# Patient Record
Sex: FEMALE | Race: WHITE
[De-identification: names, ages, dates, MRNs, and addresses within clinical notes are randomized per-mention and may not be internally consistent; named-entity substitution may affect disease eponyms.]

---
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
## 2021-04-14 ENCOUNTER — HOSPITAL ENCOUNTER (EMERGENCY)
Dept: HOSPITAL 63 - ER | Age: 21
Discharge: HOME | End: 2021-04-14
Payer: MEDICAID

## 2021-04-14 VITALS — HEIGHT: 65 IN | WEIGHT: 230.6 LBS | BODY MASS INDEX: 38.42 KG/M2

## 2021-04-14 VITALS — DIASTOLIC BLOOD PRESSURE: 87 MMHG | SYSTOLIC BLOOD PRESSURE: 150 MMHG

## 2021-04-14 DIAGNOSIS — Z91.040: ICD-10-CM
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
## 2021-04-14 DIAGNOSIS — F31.9: ICD-10-CM

## 2021-04-14 DIAGNOSIS — F41.9: ICD-10-CM

## 2021-04-14 DIAGNOSIS — S83.92XA: Primary | ICD-10-CM
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
## 2021-04-14 DIAGNOSIS — F17.210: ICD-10-CM

## 2021-04-14 PROCEDURE — 73564 X-RAY EXAM KNEE 4 OR MORE: CPT

## 2021-04-14 PROCEDURE — 99283 EMERGENCY DEPT VISIT LOW MDM: CPT

## 2021-04-14 NOTE — PHYS DOC
Past History


Past Medical History:  Anxiety, Bipolar, Depression, Other


Additional Past Medical Histor:  ADHD


Past Surgical History:  No Surgical History


Smoking:  Cigarettes


Alcohol Use:  None


Drug Use:  None





Adult General


Chief Complaint


Chief Complaint:  LOWER EXT PAIN





HPI


HPI


Patient is a previously healthy 21 year old female who presents to the Emergency

Room complaining of left knee pain. Patient states that she works at a  

and went to step backwards and there was a child there.  She spun to miss the 

child and twisted her knee.  She is complaining of lateral anterior knee pain.  

She states is an achy feeling.  She denies any other injuries.  She did hit the 

back of her head but she did not lose consciousness.  She is not having nausea, 

vomiting, amnesia, confusion.





Review of Systems


Review of Systems


Complete ROS is negative unless otherwise documented in HPI





Allergies


Allergies





Allergies








Coded Allergies Type Severity Reaction Last Updated Verified


 


  latex Allergy Unknown  4/14/21 Yes











Physical Exam


Physical Exam


General: Awake, alert, NAD. Well Nourished, well hydrated. Cooperative


HEENT: Atraumatic, EOMI, PERRL, airway patent, moist oral mucosa


Neck: Supple, trachea midline


GI: Soft, nondistended, nontender, no masses


MSK: Left knee: Lateral tenderness, normal range of motion, intact sensation, 

intact strength.


Skin: Warm, dry, intact


Neuro: A&O x3, speech NL, sensory and motor grossly intact, no focal deficits


Psych: Normal affect, normal mood, not suicidal or homicidal





Current Patient Data


Vital Signs





                                   Vital Signs








  Date Time  Temp Pulse Resp B/P (MAP) Pulse Ox O2 Delivery O2 Flow Rate FiO2


 


4/14/21 11:15 97.8 92 16 150/87 (108) 98 Room Air  











EKG


EKG


[]





Radiology/Procedures


Radiology/Procedures


[]





Heart Score


C/O Chest Pain:  N/A


Risk Factors:


Risk Factors:  DM, Current or recent (<one month) smoker, HTN, HLP, family 

history of CAD, obesity.


Risk Scores:


Risk Factors:  DM, Current or recent (<one month) smoker, HTN, HLP, family 

history of CAD, obesity.





Course & Med Decision Making


Course & Med Decision Making


Pertinent Labs and Imaging studies reviewed. (See chart for details)





Patient is a 21-year-old female presents to the emergency room complaining of 

knee pain after twisting her knee.  She may have a knee sprain.  X-ray does not 

show any acute fractures or dislocations.  I have discussed with the patient 

that we will place her in a knee brace and if she continues to have pain she 

will make an appointment with Dr. Dixon the orthopedic surgeon.  Patient's test 

results and vitals while in the ED were fully reviewed and discussed with the 

patient. Patient is stable and at this time does not need admission to the 

hospital. We have discussed strict return precautions and the importance of 

following up with their Primary Care Physician. Patient stated understanding and

 was given an opportunity to ask any questions. Patient is in agreement with 

plan.





Dragon Disclaimer


Dragon Disclaimer


This electronic medical record was generated, in whole or in part, using a voice

 recognition dictation system.





Departure


Departure:


Impression:  


   Primary Impression:  


   Knee sprain


Disposition:  01 HOME / SELF CARE / HOMELESS


Condition:  STABLE


Referrals:  


PCPDANIELITO (PCP)








GUADALUPE DIXON MD


Patient Instructions:  Knee Sprain


Scripts


Ibuprofen (IBUPROFEN) 800 Mg Tablet


1 TAB PO TID for inflammation, #30 TAB


   Prov: LUIS ENRIQUE VASQUEZ MD         4/14/21











LUIS ENRIQUE VASQUEZ MD            Apr 14, 2021 12:53

## 2021-04-14 NOTE — RAD
Exam Date:  4/14/2021 11:15 AM



XR KNEE _4 VIEWS WITH PATELLA_LT



Indication: Reason: FELL THIS MORNING, STILL HAVING PAIN / Spl. Instructions:  / History: 



COMPARISON: July 2, 2019



FINDINGS/

IMPRESSION:  





No acute fracture or dislocation.  Alignment and joint spaces are maintained.  The soft tissues are w
ithin normal limits.  



Electronically signed by: Husam Espino MD (4/14/2021 12:16 PM) [id number]

## 2021-04-30 ENCOUNTER — HOSPITAL ENCOUNTER (EMERGENCY)
Dept: HOSPITAL 63 - ER | Age: 21
Discharge: HOME | End: 2021-04-30
Payer: MEDICAID

## 2021-04-30 VITALS — BODY MASS INDEX: 36.91 KG/M2 | HEIGHT: 65 IN | WEIGHT: 221.56 LBS

## 2021-04-30 VITALS — DIASTOLIC BLOOD PRESSURE: 89 MMHG | SYSTOLIC BLOOD PRESSURE: 133 MMHG

## 2021-04-30 DIAGNOSIS — F31.9: ICD-10-CM

## 2021-04-30 DIAGNOSIS — Z88.8: ICD-10-CM

## 2021-04-30 DIAGNOSIS — F17.220: ICD-10-CM

## 2021-04-30 DIAGNOSIS — Z91.040: ICD-10-CM

## 2021-04-30 DIAGNOSIS — F41.9: ICD-10-CM

## 2021-04-30 DIAGNOSIS — J45.901: Primary | ICD-10-CM

## 2021-04-30 PROCEDURE — 99285 EMERGENCY DEPT VISIT HI MDM: CPT

## 2021-04-30 PROCEDURE — 71046 X-RAY EXAM CHEST 2 VIEWS: CPT

## 2021-04-30 PROCEDURE — 94640 AIRWAY INHALATION TREATMENT: CPT

## 2021-04-30 NOTE — PHYS DOC
Past History


Past Medical History:  Anxiety, Bipolar, Depression, Other


Additional Past Medical Histor:  ADHD; environmental allergies


Past Surgical History:  No Surgical History


Smoking:  Cigarettes


Additional Smoking Information:  


vaps


Alcohol Use:  None


Drug Use:  None





Adult General


Chief Complaint


Chief Complaint:  SHORTNESS OF BREATH





HPI


HPI





Patient is a 21-year-old female presents emergency department complaining of an 

asthma attack.  Patient states she has not been formally diagnosed with asthma, 

however has a albuterol MDI that she uses when she has asthma attacks at home.  

Patient states she became acutely short of breath with audible wheezing at 

approximately 9 AM this morning, used her inhaler which resolved some, patient 

states it came back so she decided to come to the emergency department for 

evaluation.  Patient denies any chest pains, chest congestion, nasal congestion,

abdominal pains, patient denies any other physical complaints or physical 

concerns.  Patient reports an allergy to Wellbutrin and latex.  Patient states 

she had a miscarriage on 2020, reported her last menstrual cycle was 

2021.  Patient reports home meds of Vyvanse, Abilify, Zoloft, and 

albuterol as needed.





Review of Systems


Review of Systems





14 body systems of review of systems have been reviewed.  See HPI for pertinent 

positives and negative responses, otherwise all other systems are negative, 

nonpertinent or noncontributory.





Current Medications


Current Medications





Current Medications








 Medications


  (Trade)  Dose


 Ordered  Sig/Lamar  Start Time


 Stop Time Status Last Admin


Dose Admin


 


 Albuterol Sulfate


  (Ventolin)  2.5 mg  1X  ONCE  21 17:30


 21 17:31  21 17:18


2.5 MG


 


 Albuterol/


 Ipratropium


  (Duoneb)  3 ml  1X  ONCE  21 16:15


 21 16:18 DC 21 16:26


3 ML


 


 Prednisone


  (Prednisone)  50 mg  1X  ONCE  21 16:15


 21 16:18 DC 21 16:26


50 MG











Allergies


Allergies





Allergies








Coded Allergies Type Severity Reaction Last Updated Verified


 


  bupropion Allergy Unknown  21 Yes


 


  latex Allergy Unknown  21 Yes











Physical Exam


Physical Exam





Constitutional: Well developed, well nourished, no acute distress, non-toxic 

appearance.  21-year-old female in mild respiratory distress.


HENT: Normocephalic, atraumatic, bilateral external ears normal, oropharynx 

moist, no oral exudates, nose normal. 


Eyes: PERRLA, EOMI, conjunctiva normal, no discharge.  


Neck: Normal range of motion, no tenderness, supple, no stridor.  


Cardiovascular:Heart rate regular rhythm, no murmur 


Lungs & Thorax: Bilateral I/E wheezing all lung fields auscultation.


Abdomen: Bowel sounds normal, soft, no tenderness, no masses, no pulsatile 

masses.  


Skin: Warm, dry, no erythema, no rash.  


Back: No tenderness, no CVA tenderness.  


Extremities: No tenderness, no cyanosis, no clubbing, ROM intact, no edema.  


Neurologic: Alert and oriented X 3, normal motor function, normal sensory 

function, no focal deficits noted. 


Psychologic: Affect normal, judgement normal, mood normal.





Current Patient Data


Vital Signs





                                   Vital Signs








  Date Time  Temp Pulse Resp B/P (MAP) Pulse Ox O2 Delivery O2 Flow Rate FiO2


 


21 17:19  82 17 128/85 (99) 95 Room Air  


 


21 16:07 98.2       











EKG


EKG


[]





Radiology/Procedures


Radiology/Procedures


PATIENT: BRIAN LEON NACCOUNT: [id number]     MRN#: [id number]


: 2000           LOCATION: ER              AGE: 21


SEX: F                    EXAM DT: 21         ACCESSION#: [id number]


STATUS: PRE ER            ORD. PHYSICIAN: RAMSES QIU


REASON: SHORT OF BREATH


PROCEDURE: CHEST PA & LATERAL





EXAM: Chest, 2 views.





HISTORY: Shortness of breath.





COMPARISON: 2020





FINDINGS: 2 views of the chest are obtained. There is no infiltrate, pleural 

effusion or pneumothorax. The heart is normal in size.





IMPRESSION: No acute pulmonary finding.





Electronically signed by: Liss Membreno MD (2021 4:23 PM) [id number]














DICTATED AND SIGNED BY:     LISS MEMBRENO MD


DATE:     21 1623





CC: RAMSES QIU; PCP,NO ~MTH0 0





Heart Score


C/O Chest Pain:  No


Risk Factors:


Risk Factors:  DM, Current or recent (<one month) smoker, HTN, HLP, family 

history of CAD, obesity.


Risk Scores:


Risk Factors:  DM, Current or recent (<one month) smoker, HTN, HLP, family 

history of CAD, obesity.





Course & Med Decision Making


Course & Med Decision Making


Pertinent Labs and Imaging studies reviewed. (See chart for details)





21-year-old female, vital signs reviewed, presents to the emergency department 

complaining of an asthma attack.  Physical examination consistent with acute 

asthma exacerbation.  An albuterol/Atrovent MDI was ordered along with 50 mg 

p.o. prednisone.  Will reevaluate after period of time.





Upon reevaluation of the patient, patient no longer in respiratory distress, 

lung sounds revealed light expiratory wheezing bilateral upper lobes, will order

 an additional albuterol MDI treatment.





After period of time, reevaluation of the patient, patient in no apparent 

distress, patient no apparent respiratory distress, patient's lung sounds clear 

to auscultation all lung fields.  Patient states she feels 100% better and 

wishes to go home at this time.





Discussed with patient need to follow-up with primary care provider to have 

asthma study performed so that she may start on more focused asthma treatment 

medications.  Patient gave verbal understanding of discharge home instructions, 

follow-up with primary care soon, return to ER precautions or concerns, patient 

was discharged home without incident.





Dragon Disclaimer


Dragon Disclaimer


This electronic medical record was generated, in whole or in part, using a voice

 recognition dictation system.





Departure


Departure:


Impression:  


   Primary Impression:  


   Asthma exacerbation


Disposition:  01 HOME / SELF CARE / HOMELESS


Condition:  GOOD


Referrals:  


PCP,DANIELITO (PCP)








JOSH MONTOYA


Patient Instructions:  Asthma, Adult





Additional Instructions:  


You were seen today for an asthma attack.  You were started on albuterol 

treatments and prednisone today.  I am prescribing you prednisone you need to 

take 1 tablet 1 time a day for the next 5 days.  I encourage you to follow-up 

with a primary care physician to have your asthma evaluated so a more focused 

treatment can be formulated to prevent future asthma attacks and asthma 

exacerbations.  Please return to the emergency department for worsening symptoms

 or other concerns.





EMERGENCY DEPARTMENT GENERAL DISCHARGE INSTRUCTIONS





Thank you for coming to [facility] (ED) today and trusting us

 with you 


care.  We trust that you had a positivie experience in our Emergency Department.

  If you 


wish to speak to the department management, you may call the director at 

[phone].





YOUR FOLLOW UP INSTRUCTIONS ARE AS FOLLOWS:





1.  Do you have a private Doctor?  If you do not have a private doctor, please 

ask for a 


resource list of physicians or clinics that may be able to assist you with 

follow up care.





2.  The Emergency Physician has interpreted your x-rays.  The X-Ray specialist 

will also 


review them.  If there is a change in the findings, you will be notified in 48 

hours when at 


all possible.





3.  A lab test or culture has been done, your results will be reviewed and you 

will be 


notified if you need a change in treatment.





ADDITIONAL INSTRUCTIONS AND INFORMATION:





1.  Your care today has been supervised by a physician who is specially trained 

in emergency 


care.  Many problems require more than one evaluation for a complete diagnosis 

and 


treatment.  We recommend that you schedule your follow up appointment as 

recommended to 


ensure complete treatment of you illness or injury.  If you are unable to obtain

 follow up 


care and continue to have a problem, or if your condition worsens, we recommend 

that you 


return to the ED.





2.  We are not able to safely determine your condition over the phone nor are we

 able to 


give sound medical advice over the phone.  For these safety reasons, if you call

 for medical 


advice we will ask you to come to the ED for further evaluation.





3.  If you have any questions regarding these discharge instructions please call

 the ED at 


[phone].





SAFETY INFORMATION:





In the interest of safety, wellness, and injury prevention; we encourage you to 

wear your 


sealbelt, if you smoke; quite smoking, and we encourage family to use a 

protective helmet 


for bicycling and other sporting events that present an increased risk for head 

injury.





IF YOUR SYMPTOMS WORSEN OR NEW SYMPTOMS DEVELOP, OR YOU HAVE CONCERNS ABOUT YOUR

 CONDITION; 


OR IF YOUR CONDITION WORSENS WHILE YOU ARE WAITING FOR YOUR FOLLOW UP APPOINTM

ENT; EITHER 


CONTACT YOUR PRIMARY CARE DOCTOR, THE PHYSICIAN WHOSE NAME AND NUMBER YOU WERE 

GIVEN, OR 


RETURN TO THE ED IMMEDIATELY.


Scripts


Prednisone (PREDNISONE) 20 Mg Tablet


1 TAB PO DAILY for allergies for 5 Days, #5 TAB 0 Refills


   Prov: RAMSES QIU         21





Problem Qualifiers








   Primary Impression:  


   Asthma exacerbation


   Asthma severity:  mild  Asthma persistence:  intermittent  Qualified Codes:  

   J45.21 - Mild intermittent asthma with (acute) exacerbation








RAMSES QIU       2021 17:34

## 2021-04-30 NOTE — RAD
EXAM: Chest, 2 views.



HISTORY: Shortness of breath.



COMPARISON: 12/29/2020



FINDINGS: 2 views of the chest are obtained. There is no infiltrate, pleural effusion or pneumothorax
. The heart is normal in size.



IMPRESSION: No acute pulmonary finding.



Electronically signed by: Liss Sherwood MD (4/30/2021 4:23 PM) [id number]

## 2021-05-01 ENCOUNTER — HOSPITAL ENCOUNTER (EMERGENCY)
Dept: HOSPITAL 63 - ER | Age: 21
Discharge: HOME | End: 2021-05-01
Payer: COMMERCIAL

## 2021-05-01 VITALS
DIASTOLIC BLOOD PRESSURE: 80 MMHG | DIASTOLIC BLOOD PRESSURE: 80 MMHG | SYSTOLIC BLOOD PRESSURE: 123 MMHG | SYSTOLIC BLOOD PRESSURE: 123 MMHG

## 2021-05-01 VITALS — HEIGHT: 65 IN | WEIGHT: 221.56 LBS | BODY MASS INDEX: 36.91 KG/M2

## 2021-05-01 DIAGNOSIS — F17.210: ICD-10-CM

## 2021-05-01 DIAGNOSIS — F31.9: ICD-10-CM

## 2021-05-01 DIAGNOSIS — J45.901: Primary | ICD-10-CM

## 2021-05-01 DIAGNOSIS — Z88.8: ICD-10-CM

## 2021-05-01 DIAGNOSIS — Z91.040: ICD-10-CM

## 2021-05-01 DIAGNOSIS — F90.9: ICD-10-CM

## 2021-05-01 PROCEDURE — 99283 EMERGENCY DEPT VISIT LOW MDM: CPT

## 2021-05-01 NOTE — PHYS DOC
Past History


Past Medical History:  Anxiety, Asthma, Bipolar, Depression, Other


Additional Past Medical Histor:  ADHD; environmental allergies


Past Surgical History:  No Surgical History


Smoking:  Cigarettes


Alcohol Use:  None


Drug Use:  None





Adult General


Chief Complaint


Chief Complaint:  ASTHMA





HPI


HPI





Patient is a 21-year-old female presenting for shortness of breath.  She was 

seen less than 12 hours ago at our facility and diagnosed with acute asthma 

exacerbation, she has history of this.  She was given an albuterol nebulized 

treatment and steroids with significant improvement in symptoms and discharged 

home.  Nonetheless, patient lost her rescue albuterol inhaler and has been 

without this.  States her wheezing has been worsening and comes back requesting 

another albuterol treatment.  No fever, no other concerning signs or symptoms 

such as cyanosis, hemoptysis etc.





Review of Systems


Review of Systems


Fourteen body systems of review of systems have been reviewed. See HPI for 

pertinent positives and negative responses, other wise all other systems are 

negative, non-pertinent or non-contributory





Allergies


Allergies





Allergies








Coded Allergies Type Severity Reaction Last Updated Verified


 


  bupropion Allergy Unknown  4/30/21 Yes


 


  latex Allergy Unknown  4/30/21 Yes











Physical Exam


Physical Exam


Constitutional: Well developed, well nourished, no acute distress, non-toxic 

appearance. 


HENT: Normocephalic, atraumatic, bilateral external ears normal, oropharynx 

moist, no oral exudates, nose normal. 


Eyes: PERRLA, EOMI, conjunctiva normal, no discharge.  


Neck: Normal range of motion, no tenderness, supple, no stridor.  


Cardiovascular: Heart rate regular, sinus rhythm, no murmurs rubs or gallops


Lungs & Thorax: No obvious respiratory distress, no increased work of breathing,

there is wheezing present in bilateral lobes most prominent on expiration


Abdomen: Bowel sounds normal, soft, no tenderness, no masses, no pulsatile 

masses.  Nonsurgical abdomen, no peritoneal signs


Skin: Warm, dry, no erythema, no rash.  


Back: No tenderness, no CVA tenderness.  


Extremities: No tenderness, no cyanosis, no clubbing, ROM intact, no edema.  


Neurologic: Alert and oriented X 3, grossly normal motor & sensory function, no 

focal deficits noted. 


Psychologic: Affect normal, judgement normal, mood normal.





Current Patient Data


Vital Signs





                                   Vital Signs








  Date Time  Temp Pulse Resp B/P (MAP) Pulse Ox O2 Delivery O2 Flow Rate FiO2


 


5/1/21 00:40 98.3 112 20 123/80 (94) 95 Room Air  











EKG


EKG


[]





Radiology/Procedures


Radiology/Procedures


[]





Heart Score


C/O Chest Pain:  No


Risk Factors:


Risk Factors:  DM, Current or recent (<one month) smoker, HTN, HLP, family 

history of CAD, obesity.


Risk Scores:


Risk Factors:  DM, Current or recent (<one month) smoker, HTN, HLP, family 

history of CAD, obesity.





Course & Med Decision Making


Course & Med Decision Making


Tachycardic otherwise hemodynamically stable.  HPI and physical exam consistent 

with asthma exacerbation


Inhaler with spacer given to patient while in ER, patient was educated on use of

 this and x2 puffs were administered with complete resolution of patient's 

wheezing and symptoms of dyspnea.


I reviewed patient's work-up from earlier today, chest x-ray was unremarkable.  

She has no concerning signs or risk factors for blood clots, discussed little 

utility in obtaining further diagnostic work-up for this given improvement from 

albuterol inhaler


Advised her her to continue this, to fill previously prescribed steroids ASAP 

and present for repeat evaluation by primary care physician this upcoming week. 

 Strict return precautions discussed with good understanding, all questions and 

concerns addressed prior to your departure





Dragon Disclaimer


Dragon Disclaimer


This electronic medical record was generated, in whole or in part, using a voice

 recognition dictation system.





Departure


Departure:


Impression:  


   Primary Impression:  


   Asthma exacerbation


Disposition:  01 HOME / SELF CARE / HOMELESS


Condition:  IMPROVED


Referrals:  


PCP,DANIELITO (PCP)


Patient Instructions:  Asthma, Acute Bronchospasm





Additional Instructions:  


You were seen for an asthma exacerbation. You should be taking all of your 

controller and rescue inhalers as previously prescribed. Any new medications 

today such as your prescribed steroid medication, please take those as 

prescribed as well. It may take a few days for the steroids to begin to work, 

but use albuterol as needed for the next few days to help with symptoms. Return 

to the ED if you develop worsening cough, shortness of breath, fever > 101, 

chest pain, or any other new or concerning symptoms.  You need to follow up with

 your primary care doctor as soon as possible as a severe asthma exacerbation 

can be fatal.











DINA DENG DO                  May 1, 2021 00:50

## 2021-05-22 ENCOUNTER — HOSPITAL ENCOUNTER (EMERGENCY)
Dept: HOSPITAL 63 - ER | Age: 21
LOS: 1 days | Discharge: HOME | End: 2021-05-23
Payer: MEDICAID

## 2021-05-22 DIAGNOSIS — J45.901: Primary | ICD-10-CM

## 2021-05-22 DIAGNOSIS — Z91.040: ICD-10-CM

## 2021-05-22 DIAGNOSIS — F17.210: ICD-10-CM

## 2021-05-22 DIAGNOSIS — Z88.6: ICD-10-CM

## 2021-05-22 PROCEDURE — 99285 EMERGENCY DEPT VISIT HI MDM: CPT

## 2021-05-22 PROCEDURE — 93005 ELECTROCARDIOGRAM TRACING: CPT

## 2021-05-22 PROCEDURE — 94640 AIRWAY INHALATION TREATMENT: CPT

## 2021-05-23 NOTE — PHYS DOC
Past History


Past Medical History:  Anxiety, Asthma, Bipolar, Depression, Other


Additional Past Medical Histor:  ADHD; environmental allergies


Past Surgical History:  No Surgical History


Smoking:  Cigarettes


Alcohol Use:  None


Drug Use:  None





Adult General


Chief Complaint


Chief Complaint:  SHORTNESS OF BREATH





HPI


HPI


Patient is a 21-year-old female with a past medical history significant for 

asthma who presents with asthma exacerbation.  States she started having some 

wheezing today but did not have any albuterol or steroids at home.  Denies any 

recent travel, illnesses, fevers, chest pain, abdominal pain, nausea, vomiting, 

dysuria, hematuria or blood in the stool.  Denies any known ill contacts.  

Denies any exposure to smoke or chemicals.  States that she has an appointment 

next month with her primary care physician to discuss the diagnosis of asthma 

and start medications for this.





Review of Systems


Review of Systems


Review of systems otherwise unremarkable except noted in HPI





Current Medications


Current Medications





Current Medications








 Medications


  (Trade)  Dose


 Ordered  Sig/Lamar  Start Time


 Stop Time Status Last Admin


Dose Admin


 


 Albuterol/


 Ipratropium


  (Duoneb)  3 ml  1X  ONCE  5/22/21 23:00


 5/22/21 23:01 DC 5/22/21 22:44


3 ML











Allergies


Allergies





Allergies








Coded Allergies Type Severity Reaction Last Updated Verified


 


  bupropion Allergy Unknown  4/30/21 Yes


 


  latex Allergy Unknown  4/30/21 Yes











Physical Exam


Physical Exam





Constitutional: Well developed, well nourished, no acute distress, non-toxic 

appearance. []


HENT: Normocephalic, atraumatic, bilateral external ears normal, oropharynx 

moist, no oral exudates, nose normal. []


Eyes: conjunctiva normal, no discharge. [] 


Neck: Normal range of motion, no tenderness, supple, no stridor. [] 


Cardiovascular:Heart rate regular rhythm, no murmur []


Lungs & Thorax: Mild, bilateral end expiratory wheeze


Neurologic: Alert and oriented X 3, normal motor function, normal sensory 

function, no focal deficits noted. []


Psychologic: Affect normal, judgement normal, mood normal. []





Current Patient Data


Vital Signs





                                   Vital Signs








  Date Time  Temp Pulse Resp B/P (MAP) Pulse Ox O2 Delivery O2 Flow Rate FiO2


 


5/22/21 22:46     99 Room Air  











EKG


EKG


[]





Radiology/Procedures


Radiology/Procedures


[]





Heart Score


C/O Chest Pain:  No


Risk Factors:


Risk Factors:  DM, Current or recent (<one month) smoker, HTN, HLP, family 

history of CAD, obesity.


Risk Scores:


Risk Factors:  DM, Current or recent (<one month) smoker, HTN, HLP, family 

history of CAD, obesity.





Course & Med Decision Making


Course & Med Decision Making


Patient is a 21-year-old female who presents with asthma exacerbation


Vital signs not concerning.  Physical exam noted above.  Given steroids and 

dexamethasone.  Also gave an albuterol inhaler with spacer and shown how to use 

it.  Gave him prescription for albuterol as well to last until she sees her 

primary care physician.


Advised to keep her upcoming appointment with primary care.  Gave return 

precautions to the ED.  Patient grateful, verbalized understanding and agreed 

with plan of discharge.


[]





Dragon Disclaimer


Dragon Disclaimer


This electronic medical record was generated, in whole or in part, using a voice

 recognition dictation system.





Departure


Departure:


Impression:  


   Primary Impression:  


   Asthma exacerbation


Disposition:  01 HOME / SELF CARE / HOMELESS


Condition:  GOOD


Referrals:  


PCP,NO (PCP)








HERBERTH CHAN MD


Patient Instructions:  Asthma Attacks, Prevention, Asthma, Acute Bronchospasm





Additional Instructions:  


Please read all the attached information very carefully.  Please use your 

albuterol as discussed and demonstrated.  Please keep your upcoming appointment 

with your primary care physician to discuss your ED visits and need for asthma 

medication.  Please come back to the emergency department immediately with new 

or concerning symptoms as discussed.


Scripts


Albuterol Sulfate (PROAIR HFA INHALER) 8.5 Gm Hfa.aer.ad


2 PUFF IH PRN Q4-6HRS PRN for wheezing for 21 Days, #1 INHALER 3 Refills


   Prov: LEILA BARBOSA MD         5/23/21











LEILA BARBOSA MD               May 23, 2021 00:06

## 2021-05-23 NOTE — EKG
[facility] [address], [place] [address]

Test Date:    2021               Test Time:    00:02:35

Pat Name:     BRIAN LEON      Department:   

Patient ID:   [id number]           Room:          

Gender:       F                        Technician:   LACI

:          2000               Requested By: LEILA BARBOSA

Order Number: [id number]            Reading MD:     

                                 Measurements

Intervals                              Axis          

Rate:         91                       P:            37

CO:           124                      QRS:          39

QRSD:         74                       T:            -11

QT:           344                                    

QTc:          425                                    

                           Interpretive Statements

SINUS RHYTHM

CONSIDER RIGHT VENTRICULAR HYPERTROPHY

ST & T ABNORMALITY, CONSIDER

ANTEROSEPTAL ISCHEMIA OR LEFT VENTRICULAR STRAIN

INFERIOR ISCHEMIA OR LEFT VENTRICULAR STRAIN

T ABNORMALITY IN ANTERIOR LEADS

ABNORMAL ECG

RI6.02

No previous ECG available for comparison

## 2021-05-24 ENCOUNTER — HOSPITAL ENCOUNTER (EMERGENCY)
Dept: HOSPITAL 63 - ER | Age: 21
Discharge: HOME | End: 2021-05-24
Payer: MEDICAID

## 2021-05-24 VITALS — BODY MASS INDEX: 36.91 KG/M2 | WEIGHT: 221.56 LBS | HEIGHT: 65 IN

## 2021-05-24 VITALS — DIASTOLIC BLOOD PRESSURE: 76 MMHG | SYSTOLIC BLOOD PRESSURE: 155 MMHG

## 2021-05-24 DIAGNOSIS — Z91.040: ICD-10-CM

## 2021-05-24 DIAGNOSIS — F41.9: ICD-10-CM

## 2021-05-24 DIAGNOSIS — J45.901: Primary | ICD-10-CM

## 2021-05-24 DIAGNOSIS — Z88.8: ICD-10-CM

## 2021-05-24 DIAGNOSIS — F31.9: ICD-10-CM

## 2021-05-24 DIAGNOSIS — F17.210: ICD-10-CM

## 2021-05-24 DIAGNOSIS — J02.9: ICD-10-CM

## 2021-05-24 LAB
AMPHETAMINE/METHAMPHETAMINE: (no result)
APTT PPP: YELLOW S
BACTERIA #/AREA URNS HPF: 0 /HPF
BARBITURATES UR-MCNC: (no result) UG/ML
BENZODIAZ UR-MCNC: (no result) UG/L
BILIRUB UR QL STRIP: (no result)
CANNABINOIDS UR-MCNC: (no result) UG/L
COCAINE UR-MCNC: (no result) NG/ML
FIBRINOGEN PPP-MCNC: (no result) MG/DL
GLUCOSE UR STRIP-MCNC: (no result) MG/DL
METHADONE SERPL-MCNC: (no result) NG/ML
NITRITE UR QL STRIP: (no result)
OPIATES UR-MCNC: (no result) NG/ML
PCP SERPL-MCNC: (no result) MG/DL
RBC #/AREA URNS HPF: 0 /HPF (ref 0–2)
SP GR UR STRIP: 1.02
SQUAMOUS #/AREA URNS LPF: (no result) /LPF
UROBILINOGEN UR-MCNC: 1 MG/DL
WBC #/AREA URNS HPF: (no result) /HPF (ref 0–4)

## 2021-05-24 PROCEDURE — 36415 COLL VENOUS BLD VENIPUNCTURE: CPT

## 2021-05-24 PROCEDURE — 96372 THER/PROPH/DIAG INJ SC/IM: CPT

## 2021-05-24 PROCEDURE — 80307 DRUG TEST PRSMV CHEM ANLYZR: CPT

## 2021-05-24 PROCEDURE — 99284 EMERGENCY DEPT VISIT MOD MDM: CPT

## 2021-05-24 PROCEDURE — 81025 URINE PREGNANCY TEST: CPT

## 2021-05-24 PROCEDURE — 81001 URINALYSIS AUTO W/SCOPE: CPT

## 2021-05-24 PROCEDURE — 71045 X-RAY EXAM CHEST 1 VIEW: CPT

## 2021-05-24 NOTE — PHYS DOC
Past History


Past Medical History:  Anxiety, Asthma, Bipolar, Depression, Other


Additional Past Medical Histor:  ADHD; environmental allergies


Past Surgical History:  No Surgical History


Smoking:  Cigarettes


Alcohol Use:  None


Drug Use:  None





General Adult


EDM:


Chief Complaint:  DYSPNEA/RESPIRATOY DISTRESS





HPI:


HPI:


".. I was here two days ago.. got some steroid pills and inhaler... but I am 

still coughing.. "   " My sputum now is gray green.. "..





Patient is a 21 year old female who presents with above hx with cough, 

productive sputum, fever, and asthma exacerbation.   Pt. has multiple seasonal 

allergies.  Does vape.  Pt. states she not gotten flu or COVID shot s because 

she is trying to get pregnant.  Pt. Follow with Dr. giordano at [facility] for assistance in getting pregnant.  Doctors at [facility] told 

her not to get vaccinations if she is trying to get pregnant.  Patient does have

history of asthma, anxiety, ADHD, bipolar, depression.  Patient has not been 

admitted for an asthma exacerbation or intubated.  Patient does not know her 

best peak flow.  Multiple seasonal allergies.  Patient has not completed a flu 

vaccination, Pneumovax, or Covid vaccination because she states she was told 

this may affect her ability to get pregnant.  Patient does continue to vape.  

Pt. follows with Dr. Badillo as a primary.  No recent travel outside the [facility].  No specific ill contacts.  No history of 

immunosuppression.





Review of Systems:


Review of Systems:


Constitutional: Complains of fever 


Eyes:  Denies change in visual acuity 


HENT: Complains nasal congestion and sore throat 


Respiratory: Complains of cough with productive gray-green sputum and wheezing


Cardiovascular:  Denies chest pain or edema 


GI:  Denies abdominal pain, nausea, vomiting, bloody stools or diarrhea 


: Denies dysuria 


Musculoskeletal:  Denies back pain or joint pain 


Integument:  Denies rash 


Neurologic:  Denies headache, focal weakness or sensory changes 


Endocrine:  Denies polyuria or polydipsia 


Lymphatic:  Denies swollen glands 


Psychiatric:  Denies depression or anxiety





Family History:


Family History:


Noncontributory to presentation





Current Medications:


Current Meds:


See nursing for home meds





Allergies:


Allergies:





Allergies








Coded Allergies Type Severity Reaction Last Updated Verified


 


  bupropion Allergy Unknown  21 Yes


 


  latex Allergy Unknown  21 Yes











Physical Exam:


PE:





Constitutional: Moderate acute distress, non-toxic appearance. []


HENT: Normocephalic, atraumatic, bilateral external ears normal, oropharynx 

moist, no oral exudates, nose injected swollen turbinates.  Rhinorrhea.  Tender 

sinuses.  Postnasal drainage.  Injected pharynx


Eyes: PERRLA, EOMI, conjunctiva normal, no discharge. [] 


Neck: Normal range of motion, no tenderness, supple, no stridor. [] 


Cardiovascular:Heart rate regular rhythm, no murmur [].  The bedside monitor 

shows a sinus rhythm


Lungs & Thorax:  Bilateral breath sounds equal apex with scattered wheezes 

throughout on auscultation []


Abdomen: Bowel sounds normal, soft, no tenderness, no masses, no pulsatile 

masses.  Obese.


Skin: Warm, dry, no erythema, no rash. [] 


Back: No tenderness, no CVA tenderness. [] 


Extremities: No tenderness, no cyanosis, no clubbing, ROM intact, no edema.  No 

cording.


Neurologic: Alert and oriented X 3, normal motor function, normal sensory 

function, no focal deficits noted. []


Psychologic: Affect anxious, judgement normal, mood normal. []





Current Patient Data:


Labs:





                                Laboratory Tests








Test


 21


19:59


 


POC Urine HCG, Qualitative


 hcg negative


(Negative)











EKG:


EKG:


[]





Radiology/Procedures:


Radiology/Procedures:


[][facility] [address], [place] [address]


                                 [phone]


                                        


                                 IMAGING REPORT





                                     Signed





PATIENT: BRIAN LEON NACCOUNT: [id number]     MRN#: [id number]


: 2000           LOCATION: ER              AGE: 21


SEX: F                    EXAM DT: 21         ACCESSION#: [id number]


STATUS: REG ER            ORD. PHYSICIAN: SAAD DUKE MD


REASON: DYSPNEA


PROCEDURE: CHEST AP ONLY





XR CHEST 1V





History: Reason: DYSPNEA / Spl. Instructions:  / History: 





Comparison: 2021





Findings:


No consolidation or pleural effusion. Normal heart size. No pneumothorax.





Impression: 


1.  No acute cardiopulmonary process.





Electronically signed by: Kush Sandoval DO (2021 8:58 PM) [facility]














DICTATED AND SIGNED BY:     KUSH SANDOVAL DO


DATE:     21





CC: SAAD DUKE MD; MICKEY BADILLO MD ~MTH0 0





Heart Score:


C/O Chest Pain:  N/A


HEART Score for Chest Pain:  








HEART Score for Chest Pain Response (Comments) Value


 


History Slighlty/Non-Suspicious 0


 


ECG Normal 0


 


Age < 45 0


 


Risk Factors                            1 or 2 Risk Factors 1


 


Total  1








Risk Factors:


Risk Factors:  DM, Current or recent (<one month) smoker, HTN, HLP, family 

history of CAD, obesity.


Risk Scores:


Score 0 - 3:  2.5% MACE over next 6 weeks - Discharge Home


Score 4 - 6:  20.3% MACE over next 6 weeks - Admit for Clinical Observation


Score 7 - 10:  72.7% MACE over next 6 weeks - Early Invasive Strategies





Course & Med Decision Making:


Course & Med Decision Making


Pertinent Labs and Imaging studies reviewed. (See chart for details)





Patient must stop vape.  Take Zithromax 250 mg daily for 5 days.  Depo Medrol IM

 40 injection given.  Patient continue her albuterol previous directed.  Patient

 use over-the-counter Flonase.  Tylenol as needed.  Keep follow-up with primary 

care.  Return if any concerns.





Impression:





1.  Asthma exacerbation


2.  Bronchitis


3.  Upper airway infection


4.   Vapes/ Tobacco use


5.   Pharyngitis





[]





Dragon Disclaimer:


Dragon Disclaimer:


This electronic medical record was generated, in whole or in part, using a voice

 recognition dictation system.





Departure


Departure:


Referrals:  


MICKEY BADILLO MD (PCP)


Scripts


Azithromycin (ZITHROMAX) 250 Mg Tablet


250 MG PO DAILY for ANTI-BIOTIC for 5 Days, #5 TAB 0 Refills


   Prov: SAAD DUKE MD         21





Dragon Disclaimer


This chart was dictated in whole or in part using Voice Recognition software in 

a busy, high-work load, and often noisy Emergency Department environment.  It 

may contain unintended and wholly unrecognized errors or omissions.











SAAD DUKE MD           May 24, 2021 20:10

## 2021-05-24 NOTE — RAD
XR CHEST 1V



History: Reason: DYSPNEA / Spl. Instructions:  / History: 



Comparison: April 30, 2021



Findings:

No consolidation or pleural effusion. Normal heart size. No pneumothorax.



Impression: 

1.  No acute cardiopulmonary process.



Electronically signed by: Kush Sandoval DO (5/24/2021 8:58 PM) [facility]

## 2021-06-28 ENCOUNTER — HOSPITAL ENCOUNTER (EMERGENCY)
Dept: HOSPITAL 63 - ER | Age: 21
Discharge: HOME | End: 2021-06-28
Payer: MEDICAID

## 2021-06-28 VITALS — SYSTOLIC BLOOD PRESSURE: 131 MMHG | DIASTOLIC BLOOD PRESSURE: 76 MMHG

## 2021-06-28 VITALS — BODY MASS INDEX: 36.91 KG/M2 | WEIGHT: 221.56 LBS | HEIGHT: 65 IN

## 2021-06-28 DIAGNOSIS — Z91.040: ICD-10-CM

## 2021-06-28 DIAGNOSIS — F17.210: ICD-10-CM

## 2021-06-28 DIAGNOSIS — J45.909: ICD-10-CM

## 2021-06-28 DIAGNOSIS — J06.9: Primary | ICD-10-CM

## 2021-06-28 PROCEDURE — 94640 AIRWAY INHALATION TREATMENT: CPT

## 2021-06-28 PROCEDURE — 99283 EMERGENCY DEPT VISIT LOW MDM: CPT

## 2021-06-28 NOTE — PHYS DOC
Past History


Past Medical History:  Anxiety, Asthma, Bipolar, Depression, Other


Additional Past Medical Histor:  ADHD; environmental allergies


 (RAMSES QIU)


Past Surgical History:  No Surgical History


 (RAMSES QIU)


Smoking:  Cigarettes


Alcohol Use:  None


Drug Use:  None


 (RAMSES QIU)





Adult General


Chief Complaint


Chief Complaint:  COUGH





HPI


HPI





Patient is a 21-year-old female presents to the emergency department with chief 

complaint of ongoing upper respiratory infection type signs and symptoms for 

greater than 6 months now.  Patient states that she seen her primary care 

physician Dr. Joshua today who told her that she should see her pulmonary 

specialist this week and there is nothing more he could do.  Patient states that

she still feels like she needs an albuterol treatment.  Patient denies any other

physical complaints or physical concerns.


 (RAMSES QIU)





Review of Systems


Review of Systems





14 body systems of review of systems have been reviewed.  See HPI for pertinent 

positives and negative responses, otherwise all other systems are negative, 

nonpertinent or noncontributory.


 (RAMSES QIU)





Current Medications


Current Medications





Current Medications








 Medications


  (Trade)  Dose


 Ordered  Sig/Lamar  Start Time


 Stop Time Status Last Admin


Dose Admin


 


 Albuterol/


 Ipratropium


  (Duoneb)  3 ml  1X  ONCE  6/28/21 21:45


 6/28/21 21:46 DC 6/28/21 22:17


3 ML








 (RAMSES QIU)





Allergies


Allergies





Allergies








Coded Allergies Type Severity Reaction Last Updated Verified


 


  bupropion Allergy Unknown  4/30/21 Yes


 


  latex Allergy Unknown  4/30/21 Yes








 (RAMSES QIU)





Physical Exam


Physical Exam





Constitutional: Well developed, well nourished, no acute distress, non-toxic 

appearance.  21-year-old female in no apparent distress.


HENT: Normocephalic, atraumatic, bilateral external ears normal, oropharynx 

moist, no oral exudates, nose normal.  Bilateral TMs within normal limits, no 

drainage from bilateral external auditory canals, no lymphadenopathy of the head

or neck appreciated, no infectious process of the oropharynx appreciated.  No 

postnasal drip appreciated.  Bilateral nasal turbinates moist, nonerythematous, 

no drainage appreciated.


Eyes: PERRLA, EOMI, conjunctiva normal, no discharge.  


Neck: Normal range of motion, no tenderness, supple, no stridor.  No meningismus

signs, no nuchal rigidity.


Cardiovascular:Heart rate regular rhythm, no murmur heart sounds S1-S2 to 

auscultation.


Lungs & Thorax:  Bilateral breath sounds clear to auscultation bronchial 

vesicular signs appreciated, no other adventitious lung sounds appreciated.


Abdomen: Bowel sounds normal, soft, no tenderness, no masses, no pulsatile 

masses.  


Skin: Warm, dry, no erythema, no rash.  


Back: No tenderness, no CVA tenderness.  


Extremities: No tenderness, no cyanosis, no clubbing, ROM intact, no edema.  


Neurologic: Alert and oriented X 3, normal motor function, normal sensory f

unction, no focal deficits noted. 


Psychologic: Affect normal, judgement normal, mood normal. 


 (RAMSES QIU)





Current Patient Data


Vital Signs





                                   Vital Signs








  Date Time  Temp Pulse Resp B/P (MAP) Pulse Ox O2 Delivery O2 Flow Rate FiO2


 


6/28/21 22:17     99 Room Air  








 (RAMSES QIU)





EKG


EKG


[]


 (RAMSES QIU)





Radiology/Procedures


Radiology/Procedures


[]


 (RAMSES QIU)





Heart Score


C/O Chest Pain:  No


Risk Factors:


Risk Factors:  DM, Current or recent (<one month) smoker, HTN, HLP, family h

istory of CAD, obesity.


Risk Scores:


Risk Factors:  DM, Current or recent (<one month) smoker, HTN, HLP, family 

history of CAD, obesity.


 (RAMSES QIU)





Course & Med Decision Making


Course & Med Decision Making


Pertinent Labs and Imaging studies reviewed. (See chart for details)





21-year-old female, vital signs reviewed, presents to the emergency department 

chief complaint of ongoing URI type signs and symptoms and is requesting a 

breathing treatment.  Discussed with patient will order a DuoNeb treatment 

related to her history of cigarette smoking.  And will order portable chest x-

ray to rule out infectious process of the lungs.  Patient amenable to this plan.





After period of time, the patient primary ED nurse stated that the patient has 

refused her chest x-ray stating that she is trying to get pregnant.  Chest x-ray

 ordered DC'd.





After period of time, reexamination the patient found the patient in no 

respiratory distress, patient states she feels much better after breathing 

treatment wishes to go home.  Patient will be discharged home.





Patient gave verbal understanding of discharge home instructions, will keep her 

pulmonary specialist appointment for June 30, return to ER precautions or 

concerns, patient had no further questions or concerns and was discharged home 

without incident.


 (RAMSES QIU)


Course & Med Decision Making


Did not see or evaluate patient.  Agree with NP's work-up and disposition per 

note.


 (LEILA BARBOSA MD)


Dragon Disclaimer


Dragon Disclaimer


This electronic medical record was generated, in whole or in part, using a voice

 recognition dictation system.


 (RAMSES QIU)





Departure


Departure:


Impression:  


   Primary Impression:  


   Upper respiratory infection


Disposition:  01 HOME / SELF CARE / HOMELESS


Condition:  GOOD


Referrals:  


MICKEY JOSHUA MD (PCP)


Patient Instructions:  Upper Respiratory Infection, Adult





Additional Instructions:  


You are seen today in the emergency department for ongoing upper respiratory 

infection symptoms, you were given a breathing treatment that you stated helped 

you very much.  Please keep your appointment with your pulmonary specialist this

 week, return to the emergency department for worsening symptoms or other 

concerns.





EMERGENCY DEPARTMENT GENERAL DISCHARGE INSTRUCTIONS





Thank you for coming to [facility] (ED) today and trusting us

 with you 


care.  We trust that you had a positivie experience in our Emergency Department.

  If you 


wish to speak to the department management, you may call the director at 

[phone].





YOUR FOLLOW UP INSTRUCTIONS ARE AS FOLLOWS:





1.  Do you have a private Doctor?  If you do not have a private doctor, please 

ask for a 


resource list of physicians or clinics that may be able to assist you with 

follow up care.





2.  The Emergency Physician has interpreted your x-rays.  The X-Ray specialist 

will also 


review them.  If there is a change in the findings, you will be notified in 48 

hours when at 


all possible.





3.  A lab test or culture has been done, your results will be reviewed and you 

will be 


notified if you need a change in treatment.





ADDITIONAL INSTRUCTIONS AND INFORMATION:





1.  Your care today has been supervised by a physician who is specially trained 

in emergency 


care.  Many problems require more than one evaluation for a complete diagnosis 

and 


treatment.  We recommend that you schedule your follow up appointment as 

recommended to 


ensure complete treatment of you illness or injury.  If you are unable to obtain

 follow up 


care and continue to have a problem, or if your condition worsens, we recommend 

that you 


return to the ED.





2.  We are not able to safely determine your condition over the phone nor are we

 able to 


give sound medical advice over the phone.  For these safety reasons, if you call

 for medical 


advice we will ask you to come to the ED for further evaluation.





3.  If you have any questions regarding these discharge instructions please call

 the ED at 


[phone].





SAFETY INFORMATION:





In the interest of safety, wellness, and injury prevention; we encourage you to 

wear your 


sealbelt, if you smoke; quite smoking, and we encourage family to use a 

protective helmet 


for bicycling and other sporting events that present an increased risk for head 

injury.





IF YOUR SYMPTOMS WORSEN OR NEW SYMPTOMS DEVELOP, OR YOU HAVE CONCERNS ABOUT YOUR

 CONDITION; 


OR IF YOUR CONDITION WORSENS WHILE YOU ARE WAITING FOR YOUR FOLLOW UP 

APPOINTMENT; EITHER 


CONTACT YOUR PRIMARY CARE DOCTOR, THE PHYSICIAN WHOSE NAME AND NUMBER YOU WERE 

GIVEN, OR 


RETURN TO THE ED IMMEDIATELY.





Problem Qualifiers








   Primary Impression:  


   Upper respiratory infection


   URI type:  unspecified URI  Qualified Codes:  J06.9 - Acute upper respiratory

    infection, unspecified








RAMSES QIU       Jun 28, 2021 22:46


LEILA BARBOSA MD               Jun 28, 2021 22:56

## 2021-11-13 ENCOUNTER — HOSPITAL ENCOUNTER (EMERGENCY)
Dept: HOSPITAL 63 - ER | Age: 21
Discharge: HOME | End: 2021-11-13
Payer: COMMERCIAL

## 2021-11-13 VITALS — BODY MASS INDEX: 36.91 KG/M2 | HEIGHT: 65 IN | WEIGHT: 221.56 LBS

## 2021-11-13 VITALS
SYSTOLIC BLOOD PRESSURE: 139 MMHG | DIASTOLIC BLOOD PRESSURE: 68 MMHG | DIASTOLIC BLOOD PRESSURE: 68 MMHG | SYSTOLIC BLOOD PRESSURE: 139 MMHG

## 2021-11-13 DIAGNOSIS — F17.210: ICD-10-CM

## 2021-11-13 DIAGNOSIS — Z88.8: ICD-10-CM

## 2021-11-13 DIAGNOSIS — F41.9: ICD-10-CM

## 2021-11-13 DIAGNOSIS — F31.9: ICD-10-CM

## 2021-11-13 DIAGNOSIS — J45.901: Primary | ICD-10-CM

## 2021-11-13 DIAGNOSIS — Z91.040: ICD-10-CM

## 2021-11-13 PROCEDURE — 99283 EMERGENCY DEPT VISIT LOW MDM: CPT

## 2021-11-13 PROCEDURE — 94640 AIRWAY INHALATION TREATMENT: CPT

## 2021-11-13 NOTE — PHYS DOC
Past History


Past Medical History:  Anxiety, Asthma, Bipolar, Depression, Other


Additional Past Medical Histor:  ADHD; environmental allergies


Past Surgical History:  No Surgical History


Smoking:  Cigarettes


Alcohol Use:  None


Drug Use:  None





Adult General


Chief Complaint


Chief Complaint:  ASTHMA





HPI


HPI


Patient is a 21-year-old female with a past medical history of asthma who 

presents with asthma exacerbation with increased wheezing today.  States she 

does have an albuterol at home and her nebulizer but her nebulizer cough broke. 

Denies any recent travels, traumas, fevers, chest pain, abdominal pain, nausea, 

vomiting, diarrhea.  Denies any known ill contacts.





Review of Systems


Review of Systems


Review of systems otherwise unremarkable except noted in HPI





Allergies


Allergies





Allergies








Coded Allergies Type Severity Reaction Last Updated Verified


 


  bupropion Allergy Unknown  4/30/21 Yes


 


  latex Allergy Unknown  4/30/21 Yes











Physical Exam


Physical Exam





Constitutional: Well developed, well nourished, no acute distress, non-toxic 

appearance. []


HENT: Normocephalic, atraumatic,  oropharynx moist, no oral exudates, nose 

normal. []


Eyes: conjunctiva normal, no discharge. [] 


Neck: Normal range of motion, no tenderness, supple, no stridor. [] 


Cardiovascular: Sinus tachycardia


Lungs & Thorax: Mild bilateral end expiratory wheeze with borderline tachypnea 

but no significant increase in work of breathing


Skin: Warm, dry, no erythema, no rash. [] 


Neurologic: Alert and oriented X 3, no focal deficits noted. []


Psychologic: Affect normal, judgement normal, mood normal. []





EKG


EKG


[]





Radiology/Procedures


Radiology/Procedures


[]





Heart Score


C/O Chest Pain:  No


Risk Factors:


Risk Factors:  DM, Current or recent (<one month) smoker, HTN, HLP, family 

history of CAD, obesity.


Risk Scores:


Risk Factors:  DM, Current or recent (<one month) smoker, HTN, HLP, family 

history of CAD, obesity.





Course & Med Decision Making


Course & Med Decision Making


Patient is 21-year-old female who presents with asthma exacerbation


Vital signs notable for sinus tachycardia.  Physical exam noted above.


Given breathing treatment and steroids.  Given MDI and instruction on use


Patient with plenty of medications at home.  Advised to call primary care 

physician on Monday to update on ED visit and set up a follow-up


Gave return precautions to the ED.  Patient grateful, verbalized understanding 

and agreed with plan of discharge.





Dragon Disclaimer


Dragon Disclaimer


This electronic medical record was generated, in whole or in part, using a voice

 recognition dictation system.





Departure


Departure:


Impression:  


   Primary Impression:  


   Asthma exacerbation


Disposition:  01 HOME / SELF CARE / HOMELESS


Condition:  GOOD


Referrals:  


BALTA CORNELIUS PA-C (PCP)


Patient Instructions:  Asthma Attacks, Prevention, Asthma, Acute Bronchospasm





Additional Instructions:  


Thank you for coming into the emergency department tonight and allowing us to 

take care of you.  Please read the attached information carefully to go back 

over some of the things we discussed.  Please take all your asthma medications 

as prescribed and use your metered-dose inhaler as demonstrated.  Please call 

your primary care physician first thing Monday to update on ED visit and set up 

a follow-up as soon as you can.  Please come back with new or concerning 

symptoms as we discussed.











LEILA BARBOSA MD               Nov 13, 2021 19:56

## 2021-12-19 ENCOUNTER — HOSPITAL ENCOUNTER (EMERGENCY)
Dept: HOSPITAL 63 - ER | Age: 21
Discharge: HOME | End: 2021-12-19
Payer: COMMERCIAL

## 2021-12-19 DIAGNOSIS — J45.901: Primary | ICD-10-CM

## 2021-12-19 DIAGNOSIS — Z88.8: ICD-10-CM

## 2021-12-19 DIAGNOSIS — F90.9: ICD-10-CM

## 2021-12-19 DIAGNOSIS — F41.9: ICD-10-CM

## 2021-12-19 DIAGNOSIS — F17.210: ICD-10-CM

## 2021-12-19 DIAGNOSIS — Z91.040: ICD-10-CM

## 2021-12-19 DIAGNOSIS — F39: ICD-10-CM

## 2021-12-19 PROCEDURE — 96374 THER/PROPH/DIAG INJ IV PUSH: CPT

## 2021-12-19 PROCEDURE — 96375 TX/PRO/DX INJ NEW DRUG ADDON: CPT

## 2021-12-19 PROCEDURE — 94640 AIRWAY INHALATION TREATMENT: CPT

## 2021-12-19 PROCEDURE — 99284 EMERGENCY DEPT VISIT MOD MDM: CPT

## 2021-12-19 NOTE — PHYS DOC
Past History


Past Medical History:  Anxiety, Asthma, Bipolar, Depression, Other


Additional Past Medical Histor:  ADHD; environmental allergies


Past Surgical History:  No Surgical History


Smoking:  Cigarettes


Alcohol Use:  None


Drug Use:  None





General Adult


HPI:


HPI:


".. I feel like .. I am having an allergic reaction.. or something...my Asthma 

is out of control.."





Patient is a 21 year old female who presents with above hx and complaints 

possible allergic reaction.  Patient states she does have quite sensitive 

asthma.  Has had periodic asthma attacks.  Has been admitted overnight for 

asthma exacerbation.  Patient does continue to smoke.  Pt. follow s with Doug 

for care.  Patient has past medical history of anxiety, bipolar disorder, 

depression, ADHD, multiple environmental allergies, and multiple ER visits for 

asthma exacerbations.  Patient does not know her best peak flow.  Patient has 

not gotten flu vaccination for this season.  Has not gotten Pneumovax.  States 

she got 1 dose of COVID vaccine but that caused her to have an asthma 

exacerbation. No recent travel. No sick ill contacts.





Review of Systems:


Review of Systems:


Constitutional:  Denies fever or chills 


Eyes:  Denies change in visual acuity 


HENT:  Denies nasal congestion or sore throat 


Respiratory:  Denies cough or shortness of breath 


Cardiovascular:  Denies chest pain or edema 


GI:  Denies abdominal pain, nausea, vomiting, bloody stools or diarrhea 


: Denies dysuria 


Musculoskeletal:  Denies back pain or joint pain 


Integument:  Denies rash 


Neurologic:  Denies headache, focal weakness or sensory changes 


Endocrine:  Denies polyuria or polydipsia 


Lymphatic:  Denies swollen glands 


Psychiatric:  Denies depression or anxiety





Family History:


Family History:


Noncontributory to presentation





Current Medications:


Current Meds:


See nursing for home meds





Allergies:


Allergies:





Allergies








Coded Allergies Type Severity Reaction Last Updated Verified


 


  bupropion Allergy Unknown  4/30/21 Yes


 


  latex Allergy Unknown  4/30/21 Yes











Physical Exam:


PE:





Constitutional: Moderate acute distress, non-toxic appearance. []


HENT: Normocephalic, atraumatic, bilateral external ears normal, oropharynx 

moist, no oral exudates, nose swollen turbinates and clear rhinorrhea. 

Multicolored hair []


Eyes: PERRLA, EOMI, conjunctiva normal, no discharge. [] 


Neck: Normal range of motion, no tenderness, supple, no stridor. [] 


Cardiovascular: Tachycardia heart rate regular rhythm, no murmur []


Lungs & Thorax:  Bilateral breath sounds equal apex with scattered wheezes on 

auscultation []


Abdomen: Bowel sounds normal, soft, no tenderness, no masses, no pulsatile 

masses. Obese.


Skin: Warm, dry, no erythema, no rash. [] 


Back: No tenderness, no CVA tenderness. [] 


Extremities: No tenderness, no cyanosis, no clubbing, ROM intact, no edema. [] 


Neurologic: Alert and oriented X 3, normal motor function, normal sensory 

function, no focal deficits noted. []


Psychologic: Affect anxious, judgement normal, mood normal. []





EKG:


EKG:


[]





Radiology/Procedures:


Radiology/Procedures:


[]





Heart Score:


C/O Chest Pain:  N/A


Risk Factors:


Risk Factors:  DM, Current or recent (<one month) smoker, HTN, HLP, family 

history of CAD, obesity.


Risk Scores:


Score 0 - 3:  2.5% MACE over next 6 weeks - Discharge Home


Score 4 - 6:  20.3% MACE over next 6 weeks - Admit for Clinical Observation


Score 7 - 10:  72.7% MACE over next 6 weeks - Early Invasive Strategies





Course & Med Decision Making:


Course & Med Decision Making


Pertinent Labs and Imaging studies reviewed. (See chart for details)





Patient continue breathing treatments up to every 4 hours. Patient to document 

peak flows in the morning before and after treatments. Document the findings. 

Take prednisone 50 mg a day for the next 5 days. Take Pepcid 20 mg twice a day 

for the next 10 days. May use Benadryl 25 to 50 mg 4 times a day for rhinorrhea 

and allergy complaints. Follow-up primary care. Strongly recommend patient stop 

smoking. Return if any concerns.





Impression:





1. Asthma exacerbation


2. Anxiety


3. Bipolar history


4. Depression


5. ADHD


6. Multiple environmental allergies


7. Tobacco use





[]





Dragon Disclaimer:


Dragon Disclaimer:


This electronic medical record was generated, in whole or in part, using a voice

 recognition dictation system.





Departure


Departure:


Referrals:  


BALTA CORNELIUS PA-C (PCP)


Scripts


Famotidine (PEPCID) 20 Mg Tablet


20 MG PO BID for allergic reaction for 10 Days, #20 TAB


   Prov: SAAD DUKE MD         12/19/21 


Prednisone (PREDNISONE) 50 Mg Tablet


50 MG PO DAILY for allergy , asthma for 5 Days, #5 TAB


   Prov: SAAD DUKE MD         12/19/21





Dragon Disclaimer


This chart was dictated in whole or in part using Voice Recognition software in 

a busy, high-work load, and often noisy Emergency Department environment.  It 

may contain unintended and wholly unrecognized errors or omissions.











SAAD DUKE MD           Dec 19, 2021 01:00

## 2022-01-16 ENCOUNTER — HOSPITAL ENCOUNTER (EMERGENCY)
Dept: HOSPITAL 63 - ER | Age: 22
Discharge: HOME | End: 2022-01-16
Payer: COMMERCIAL

## 2022-01-16 VITALS — WEIGHT: 198.42 LBS | HEIGHT: 65 IN | BODY MASS INDEX: 33.06 KG/M2

## 2022-01-16 VITALS — SYSTOLIC BLOOD PRESSURE: 124 MMHG | DIASTOLIC BLOOD PRESSURE: 80 MMHG

## 2022-01-16 DIAGNOSIS — Z3A.33: ICD-10-CM

## 2022-01-16 DIAGNOSIS — O99.513: ICD-10-CM

## 2022-01-16 DIAGNOSIS — Z91.040: ICD-10-CM

## 2022-01-16 DIAGNOSIS — Z88.8: ICD-10-CM

## 2022-01-16 DIAGNOSIS — M54.59: ICD-10-CM

## 2022-01-16 DIAGNOSIS — O99.333: ICD-10-CM
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
## 2022-01-16 DIAGNOSIS — J45.909: ICD-10-CM

## 2022-01-16 DIAGNOSIS — O26.893: Primary | ICD-10-CM
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
## 2022-01-16 PROCEDURE — 99284 EMERGENCY DEPT VISIT MOD MDM: CPT

## 2022-01-16 NOTE — PHYS DOC
Past History


Past Medical History:  Anxiety, Asthma, Bipolar, Depression, Other


Additional Past Medical Histor:  ADHD; environmental allergies


 (ROCK SALDIVAR)


Past Surgical History:  No Surgical History


 (ROCK SALDIVAR)


Smoking:  Cigarettes


Alcohol Use:  None


Drug Use:  None


 (ROCK SALDIVAR)





General Adult


EDM:


Chief Complaint:  MECHANICAL FALL





HPI:


HPI:


Patient is a 21-year-old female who presents to the emergency department for low

back pain following a fall. Patient is approximately 33 weeks pregnant. Her OB 

is at [facility]. She reports that she was walking her dog and she 

slipped on some ice and fell onto her butt. She denies hitting her head or loss 

of consciousness. She is rating low back pain 7 out of 10. She took Tylenol prio

r to arrival. Patient denies any loss of bowel or bladder, saddle anesthesias, 

nausea, vomiting. She reports abdominal pain and is concerned because she did 

feel her baby move at home but has not felt him move while in the emergency 

department. Patient is also reporting light spotting.


 (ROCK SALDIVAR)





Review of Systems:


Review of Systems:


Constitutional:  negative unless reported in HPI


Eyes:  negative unless reported in HPI


HENT:  negative unless reported in HPI


Respiratory:  negative unless reported in HPI


Cardiovascular:  negative unless reported in HPI


GI:  negative unless reported in HPI


: negative unless reported in HPI


Musculoskeletal: negative unless reported in HPI


Integument:  negative unless reported in HPI


Neurologic:  negative unless reported in HPI


Endocrine: negative unless reported in HPI


Lymphatic:  negative unless reported in HPI


Psychiatric:  negative unless reported in HPI


 (ROCK SALDIVAR)





Allergies:


Allergies:





Allergies








Coded Allergies Type Severity Reaction Last Updated Verified


 


  bupropion Allergy Unknown  4/30/21 Yes


 


  latex Allergy Unknown  4/30/21 Yes








 (ROCK SALDIVAR)





Physical Exam:


PE:





Constitutional: Well developed, well nourished, no acute distress, non-toxic 

appearance. []


HENT: Normocephalic, atraumatic, bilateral external ears normal, oropharynx mois

t, no oral exudates, nose normal. []


Eyes: PERRL, EOMI, conjunctiva normal, no discharge. [] 


Neck: Normal range of motion, no bony spinal tenderness, no step-offs or 

deformities, supple, no stridor. [] 


Cardiovascular:Heart rate regular rhythm, no murmur []


Lungs & Thorax:  Bilateral breath sounds clear to auscultation []


Abdomen: Bowel sounds normal, soft, no tenderness, pregnant abdomen, no 

pulsatile masses. [] 


Skin: Warm, dry, no erythema, no rash. [] 


Back: Normal range of motion, lumbar paraspinal tenderness with palpation


Extremities: No tenderness, no cyanosis, no clubbing, ROM intact, no edema. [] 


Neurologic: Alert and oriented X 3, normal motor function, normal sensory 

function, no focal deficits noted. []


Psychologic: Affect normal, judgement normal, mood normal. []


 (ROCK SALDIVAR)





Current Patient Data:


Vital Signs:





                                   Vital Signs








  Date Time  Temp Pulse Resp B/P (MAP) Pulse Ox O2 Delivery O2 Flow Rate FiO2


 


1/16/22 15:37 98.2 100 16 124/80 (95) 97   








 (ROCK SALDIVAR)





EKG:


EKG:


[]


 (ROCK SALDIVAR)





Radiology/Procedures:


Radiology/Procedures:


[]


 (ROCK SALDIVAR)





Heart Score:


C/O Chest Pain:  N/A


Risk Factors:


Risk Factors:  DM, Current or recent (<one month) smoker, HTN, HLP, family histo

ry of CAD, obesity.


Risk Scores:


Score 0 - 3:  2.5% MACE over next 6 weeks - Discharge Home


Score 4 - 6:  20.3% MACE over next 6 weeks - Admit for Clinical Observation


Score 7 - 10:  72.7% MACE over next 6 weeks - Early Invasive Strategies


 (ROCK SALDIVAR)





Course & Med Decision Making:


Course & Med Decision Making


Pertinent Labs and Imaging studies reviewed. (See chart for details)





[] Patient presents to the emergency department for low back pain after falling 

onto her butt today after slipping on the ice. Patient is 33 weeks pregnant and 

she is reporting lower abdominal pain and spotting. A bedside ultrasound was 

performed and patient had a negative fast, negative chorionic bleed, placenta 

was visualized in the anterior lateral position and there is no bleeding noted. 

Fetal movement noted and heart rate was 132 bpm. Patient advised to continue 

taking Tylenol for pain and follow-up with her OB, she states she has an 

appointment tomorrow. I discussed with patient all findings and diagnostic 

testing as well as the need to follow-up with PCP for further evaluation and 

treatment or return to the ER if any new or worsening symptoms.  Strict return 

precautions were also discussed at length.  Patient voiced understanding and 

agreement with the plan.  Patient is hemodynamically stable at the time of 

disposition.


 (ROCK SALDIVAR)


Course & Med Decision Making


Did not see or evaluate patient.  Did not discuss patient with NP.  Agree with 

NP's work-up and disposition per note.


 (LEILA BARBOSA MD)


Dragon Disclaimer:


Dragon Disclaimer:


This electronic medical record was generated, in whole or in part, using a voice

 recognition dictation system.


 (ROCK SALDIVAR)





Departure


Departure:


Impression:  


   Primary Impression:  


   Fall


   Qualified Codes:  W19.XXXA - Unspecified fall, initial encounter


Disposition:  01 HOME / SELF CARE / HOMELESS


Condition:  GOOD


Referrals:  


BALTA CORNELIUS PA-C (PCP)


Patient Instructions:  Fall Prevention and Home Safety, Vaginal Bleeding During 

Pregnancy, Easy-to-Read





Additional Instructions:  


You were seen in the emergency department for low back pain, spotting and 

abdominal pain following a fall. A bedside ultrasound was performed the baby's 

heart rate was 132 and we had fetal movement. You had no internal bleeding noted

 on the bedside ultrasound. Continue to take Tylenol for your back pain. Follow-

up with your OB tomorrow. Return to the emergency department if you develop 

worsening of your vaginal bleeding where you are saturating more than 1 pad an 

hour, severe abdominal pain, severe back pain, loss of bowel or bladder, loss of

 sensation in your groin or down your legs, intractable nausea or vomiting or 

any new or worsening concerns.











ROCK SALDIVAR          Jan 16, 2022 16:23


LEILA BARBOSA MD               Jan 16, 2022 17:54

## 2022-03-24 ENCOUNTER — HOSPITAL ENCOUNTER (EMERGENCY)
Dept: HOSPITAL 63 - ER | Age: 22
Discharge: HOME | End: 2022-03-24
Payer: COMMERCIAL

## 2022-03-24 VITALS — HEIGHT: 65 IN | WEIGHT: 158.73 LBS | BODY MASS INDEX: 26.45 KG/M2

## 2022-03-24 VITALS — DIASTOLIC BLOOD PRESSURE: 71 MMHG | SYSTOLIC BLOOD PRESSURE: 114 MMHG

## 2022-03-24 DIAGNOSIS — Z88.8: ICD-10-CM

## 2022-03-24 DIAGNOSIS — F17.210: ICD-10-CM

## 2022-03-24 DIAGNOSIS — Z91.040: ICD-10-CM

## 2022-03-24 DIAGNOSIS — J45.909: ICD-10-CM

## 2022-03-24 DIAGNOSIS — F31.9: ICD-10-CM

## 2022-03-24 DIAGNOSIS — F41.9: ICD-10-CM

## 2022-03-24 LAB
BASOPHILS # BLD AUTO: 0 X10^3/UL (ref 0–0.2)
BASOPHILS NFR BLD: 1 % (ref 0–3)
EOSINOPHIL NFR BLD: 1 X10^3/UL (ref 0–0.7)
EOSINOPHIL NFR BLD: 12 % (ref 0–3)
ERYTHROCYTE [DISTWIDTH] IN BLOOD BY AUTOMATED COUNT: 15 % (ref 11.5–14.5)
HCT VFR BLD CALC: 35.5 % (ref 36–47)
HGB BLD-MCNC: 11.4 G/DL (ref 12–15.5)
LYMPHOCYTES # BLD: 3.2 X10^3/UL (ref 1–4.8)
LYMPHOCYTES NFR BLD AUTO: 38 % (ref 24–48)
MCH RBC QN AUTO: 26 PG (ref 25–35)
MCHC RBC AUTO-ENTMCNC: 32 G/DL (ref 31–37)
MCV RBC AUTO: 80 FL (ref 79–100)
MONO #: 0.4 X10^3/UL (ref 0–1.1)
MONOCYTES NFR BLD: 5 % (ref 0–9)
NEUT #: 3.6 X10^3UL (ref 1.8–7.7)
NEUTROPHILS NFR BLD AUTO: 44 % (ref 31–73)
PLATELET # BLD AUTO: 281 X10^3/UL (ref 140–400)
RBC # BLD AUTO: 4.45 X10^6/UL (ref 3.5–5.4)
WBC # BLD AUTO: 8.2 X10^3/UL (ref 4–11)

## 2022-03-24 PROCEDURE — 99284 EMERGENCY DEPT VISIT MOD MDM: CPT

## 2022-03-24 PROCEDURE — 76856 US EXAM PELVIC COMPLETE: CPT

## 2022-03-24 PROCEDURE — 36415 COLL VENOUS BLD VENIPUNCTURE: CPT

## 2022-03-24 PROCEDURE — 85025 COMPLETE CBC W/AUTO DIFF WBC: CPT

## 2022-03-24 PROCEDURE — 99283 EMERGENCY DEPT VISIT LOW MDM: CPT

## 2022-03-24 NOTE — PHYS DOC
Past History


Past Medical History:  Anxiety, Asthma, Bipolar, Depression, Other


Additional Past Medical Histor:  ADHD


Past Surgical History:  Other


Smoking:  Cigarettes


Additional Smoking Information:  


vape


Alcohol Use:  None


Drug Use:  None





General Adult


EDM:


Chief Complaint:  VAGINAL BLEEDING





HPI:


HPI:





Patient is a 22 year old A0 female 3 weeks status post vaginal delivery who 

presents with vaginal bleeding.  She states that yesterday and today, she is 

used 3 pads.  Patient denies pain.  She called her obstetrician today, who 

advised she present to the nearest emergency department.  Patient states that 

she was hypertensive during pregnancy and during delivery.  She delivered 

without complication at [facility].  Patient has no other 

complaints at this time.





Review of Systems:


Review of Systems:


ROS negative or noncontributory except as mentioned in HPI.





Allergies:


Allergies:





Allergies








Coded Allergies Type Severity Reaction Last Updated Verified


 


  bupropion Allergy Unknown  21 Yes


 


  latex Allergy Unknown  21 Yes











Physical Exam:


PE:





Constitutional: Well developed, well nourished, no acute distress, non-toxic 

appearance. 


HENT: Normocephalic, atraumatic, bilateral external ears normal, nose normal. 


Eyes: EOMI, conjunctiva normal, no discharge. 


Neck: Normal range of motion, no stridor.  


Cardiovascular: Heart regular rate and rhythm.  No obvious murmur, rub or 

gallop.


Lungs & Thorax: Equal thoracic expansion, no labored breathing, bilateral breath

sounds clear to auscultation.


Abdomen: Bowel sounds normal, soft, no tenderness, no masses, no pulsatile 

masses.  


Extremities: No cyanosis, no clubbing, ROM intact, no edema, cap refill less 

than 2 seconds.  


Neurologic: Alert and oriented x4, no focal deficits noted.





Current Patient Data:


Vital Signs:





                                   Vital Signs








  Date Time  Temp Pulse Resp B/P (MAP) Pulse Ox O2 Delivery O2 Flow Rate FiO2


 


3/24/22 13:58 98.6 84 18 114/71 (85) 98 Room Air  











Radiology/Procedures:


Radiology/Procedures:


PROCEDURE: PELVIS COMPLETE








INDICATION: Reason: VAG BLEEDING, 3 WEEKS POSTPARTUM, VAG DELIVERY / Spl. 

Instructions:  / History: 





COMPARISON: None.





TECHNIQUE: Grayscale and color ultrasound images uterus and adnexa.





FINDINGS: 





Uterus: 85 x 61 x 42 mm.


9 mm endometrial stripe 





Right Ovary: 36 x 19 x 19 mm.  


Left Ovary: 39 x 19 x 17 mm. 


Vascular flow identified to bilateral ovaries.








IMPRESSION: 





*   The endometrial stripe measures approximately 9 mm without definite evidence

of retained products of conception.





Electronically signed by: Alonzo Whiteside MD (3/24/2022 2:17 PM) [id number]





Heart Score:


C/O Chest Pain:  No





Course & Med Decision Making:


Course & Med Decision Making


Pertinent Labs and Imaging studies reviewed. (See chart for details)





Patient is slightly anemic, but is not requiring blood transfusion at this time.

 Ultrasound is negative for retained products of conception.  Patient is advised

to follow-up with her obstetrician next week for evaluation of abnormal uterine 

bleeding.  Patient's questions were answered.  Return precautions were provided.

 Patient understands and is agreeable to discharge plan.





Dragon Disclaimer:


Dragon Disclaimer:


This electronic medical record was generated, in whole or in part, using a voice

recognition dictation system.





Departure


Departure:


Impression:  


   Primary Impression:  


   Postpartum examination following vaginal delivery


   Additional Impression:  


   Abnormal uterine and vaginal bleeding, unspecified


Disposition:  01 HOME / SELF CARE / HOMELESS


Condition:  STABLE


Referrals:  


BALTA CORNELIUS PA-C (PCP)


Patient Instructions:  Postpartum Care After Vaginal Delivery





Additional Instructions:  


EMERGENCY DEPARTMENT GENERAL DISCHARGE INSTRUCTIONS





Thank you for coming to [facility] (ED) today and trusting us

with you care.  We trust that you had a positive experience in our Emergency 

Department.  If you wish to speak to the department management, you may call the

director at [phone].





YOUR FOLLOW UP INSTRUCTIONS ARE AS FOLLOWS:


1.  Follow up with your primary care doctor. If you do not have a primary 

doctor, please ask for a resource list of physicians or clinics that may be able

to assist you with follow up care.


2.  The emergency provider has interpreted your imaging studies, if any were 

ordered.  The radiology imaging specialist also reviewed them.  If there is a 

change in the findings, you will be notified in 48 hours when at all possible.


3.  If a lab test or culture has been done, your results will be reviewed and 

you will be notified if you need a change in treatment.


4.  Follow instructions verbalized to you and refer to the printouts if needed.





ADDITIONAL INSTRUCTIONS AND INFORMATION:


1.  Your care today has been supervised by a physician who is specially trained 

in emergency care.  Many problems require more than one evaluation for a 

complete diagnosis and treatment.  We recommend that you schedule your follow up

appointment as recommended to ensure complete treatment of you illness or 

injury.  If you are unable to obtain follow up care and continue to have a prob

luis f, or if your condition worsens, we recommend that you return to the ED.


2.  We are not able to safely determine your condition over the phone nor are we

able to give sound medical advice over the phone.  For these safety reasons, if 

you call for medical advice we will ask you to come to the ED for further 

evaluation.


3.  If you have any questions regarding these discharge instructions please call

the ED at [phone].





SAFETY INFORMATION:


In the interest of safety, wellness, and injury prevention; we encourage you to 

wear your seat belt, if you smoke; quite smoking, and we encourage family to use

a protective helmet for bicycling and other sporting events that present an 

increased risk for head injury.





IF YOUR SYMPTOMS WORSEN OR NEW SYMPTOMS DEVELOP, OR YOU HAVE CONCERNS ABOUT YOUR

CONDITION; OR IF YOUR CONDITION WORSENS WHILE YOU ARE WAITING FOR YOUR FOLLOW UP

APPOINTMENT; EITHER CONTACT YOUR PRIMARY CARE DOCTOR, THE PHYSICIAN WHOSE NAME 

AND NUMBER YOU WERE GIVEN, OR RETURN TO THE ED IMMEDIATELY.











SUMAN GRIDER              Mar 24, 2022 14:30

## 2022-03-24 NOTE — RAD
INDICATION: Reason: VAG BLEEDING, 3 WEEKS POSTPARTUM, VAG DELIVERY / Spl. Instructions:  / History: 



COMPARISON: None.



TECHNIQUE: Grayscale and color ultrasound images uterus and adnexa.



FINDINGS: 



Uterus: 85 x 61 x 42 mm.

9 mm endometrial stripe 



Right Ovary: 36 x 19 x 19 mm.  

Left Ovary: 39 x 19 x 17 mm. 

Vascular flow identified to bilateral ovaries.





IMPRESSION: 



*   The endometrial stripe measures approximately 9 mm without definite evidence of retained products
 of conception.



Electronically signed by: Alonzo Whiteside MD (3/24/2022 2:17 PM) [id number]